# Patient Record
Sex: MALE | Race: BLACK OR AFRICAN AMERICAN | Employment: FULL TIME | URBAN - NONMETROPOLITAN AREA
[De-identification: names, ages, dates, MRNs, and addresses within clinical notes are randomized per-mention and may not be internally consistent; named-entity substitution may affect disease eponyms.]

---

## 2019-08-11 ENCOUNTER — APPOINTMENT (OUTPATIENT)
Dept: GENERAL RADIOLOGY | Age: 49
End: 2019-08-11
Payer: COMMERCIAL

## 2019-08-11 ENCOUNTER — HOSPITAL ENCOUNTER (EMERGENCY)
Age: 49
Discharge: HOME | End: 2019-08-11
Attending: EMERGENCY MEDICINE
Payer: COMMERCIAL

## 2019-08-11 VITALS
OXYGEN SATURATION: 96 % | DIASTOLIC BLOOD PRESSURE: 69 MMHG | SYSTOLIC BLOOD PRESSURE: 133 MMHG | WEIGHT: 189 LBS | TEMPERATURE: 98.8 F | HEART RATE: 66 BPM | RESPIRATION RATE: 20 BRPM

## 2019-08-11 DIAGNOSIS — T59.91XA TOXIC INHALATION INJURY, ACCIDENTAL OR UNINTENTIONAL, INITIAL ENCOUNTER: Primary | ICD-10-CM

## 2019-08-11 LAB
EKG ATRIAL RATE: 84 BPM
EKG P AXIS: 44 DEGREES
EKG P-R INTERVAL: 162 MS
EKG Q-T INTERVAL: 368 MS
EKG QRS DURATION: 82 MS
EKG QTC CALCULATION (BAZETT): 434 MS
EKG R AXIS: 7 DEGREES
EKG T AXIS: 11 DEGREES
EKG VENTRICULAR RATE: 84 BPM

## 2019-08-11 PROCEDURE — 94640 AIRWAY INHALATION TREATMENT: CPT

## 2019-08-11 PROCEDURE — 99284 EMERGENCY DEPT VISIT MOD MDM: CPT

## 2019-08-11 PROCEDURE — 71045 X-RAY EXAM CHEST 1 VIEW: CPT

## 2019-08-11 PROCEDURE — 93005 ELECTROCARDIOGRAM TRACING: CPT | Performed by: EMERGENCY MEDICINE

## 2019-08-11 PROCEDURE — 6370000000 HC RX 637 (ALT 250 FOR IP): Performed by: NURSE PRACTITIONER

## 2019-08-11 PROCEDURE — 93010 ELECTROCARDIOGRAM REPORT: CPT | Performed by: INTERNAL MEDICINE

## 2019-08-11 PROCEDURE — 2709999900 HC NON-CHARGEABLE SUPPLY

## 2019-08-11 RX ORDER — IPRATROPIUM BROMIDE AND ALBUTEROL SULFATE 2.5; .5 MG/3ML; MG/3ML
1 SOLUTION RESPIRATORY (INHALATION) ONCE
Status: COMPLETED | OUTPATIENT
Start: 2019-08-11 | End: 2019-08-11

## 2019-08-11 RX ORDER — ALBUTEROL SULFATE 90 UG/1
2 AEROSOL, METERED RESPIRATORY (INHALATION) 4 TIMES DAILY PRN
Qty: 1 INHALER | Refills: 0 | Status: SHIPPED | OUTPATIENT
Start: 2019-08-11

## 2019-08-11 RX ADMIN — IPRATROPIUM BROMIDE AND ALBUTEROL SULFATE 1 AMPULE: .5; 3 SOLUTION RESPIRATORY (INHALATION) at 03:35

## 2019-08-11 ASSESSMENT — ENCOUNTER SYMPTOMS
EYE DISCHARGE: 0
RHINORRHEA: 0
ABDOMINAL DISTENTION: 0
VOMITING: 0
COUGH: 1
SHORTNESS OF BREATH: 1
ABDOMINAL PAIN: 0
WHEEZING: 0
NAUSEA: 0
DIARRHEA: 0
EYE ITCHING: 0

## 2019-08-11 NOTE — ED NOTES
Pt is alert and oriented x4. Pt denies any chest pain, SOB and and any further problems with his cough. Pt updated on POC. All workers compensation paperwork has been completed. VSS. Call light within reach.                   Deshawn Ragsdale RN  08/11/19 2168

## 2019-08-11 NOTE — ED PROVIDER NOTES
[facility]  eMERGENCY dEPARTMENT eNCOUnter          CHIEF COMPLAINT       Chief Complaint   Patient presents with    Toxic Inhalation     Inhaled [id number]       Nurses Notes reviewed and I agreeexcept as noted in the HPI. HISTORY OF PRESENT ILLNESS    Dawson Fox is a 57 y.o. male who presents to Emergency Department with toxic inhalation which happened about 3 hours ago at work. She is was varying protection mask, his protection mask was hit incidentally and he was exposed to LK-823. This happened about one hour ago. According to the safety data sheet the ingredients of LK_823 includes Copper Oxide, Zinc Oxide, Aluminum Oxide and Cesium Carbonate. Inhalation of excessive amount of the dust may cause irritation of the respiratory system, symptoms may include coughing and difficulty in breathing. May cause physical irritation of eyes. May irriate skin. First aide measure os inhalation include move to fresh air and seek medical attention if uncomfortable in case of significant inhalation of of dust.     Patient says he has some difficulty breathing and mild chest pain with inhalation. He also has mild new onset of a cough as well. No other symptoms. He is healthy otherwise. REVIEW OF SYSTEMS     Review of Systems   Constitutional: Negative for activity change, appetite change, chills, fatigue and fever. HENT: Negative for congestion, ear discharge, hearing loss, nosebleeds and rhinorrhea. Eyes: Negative for discharge and itching. Respiratory: Positive for cough and shortness of breath. Negative for wheezing. Cardiovascular: Positive for chest pain. Gastrointestinal: Negative for abdominal distention, abdominal pain, diarrhea, nausea and vomiting. Endocrine: Negative for cold intolerance. Musculoskeletal: Negative for arthralgias, myalgias, neck pain and neck stiffness. Skin: Negative for rash and wound. Neurological: Negative for dizziness and weakness.

## 2023-11-20 ENCOUNTER — OFFICE VISIT (OUTPATIENT)
Dept: DERMATOLOGY | Facility: CLINIC | Age: 53
End: 2023-11-20
Payer: COMMERCIAL

## 2023-11-20 DIAGNOSIS — L21.9 SEBORRHEIC DERMATITIS: ICD-10-CM

## 2023-11-20 DIAGNOSIS — L73.0 ACNE KELOIDALIS NUCHAE: Primary | ICD-10-CM

## 2023-11-20 DIAGNOSIS — L73.1 PSEUDOFOLLICULITIS BARBAE: ICD-10-CM

## 2023-11-20 DIAGNOSIS — L72.9 CYST OF SKIN: ICD-10-CM

## 2023-11-20 PROCEDURE — 1159F PR MEDICATION LIST DOCUMENTED IN MEDICAL RECORD: ICD-10-PCS | Mod: CPTII,S$GLB,, | Performed by: DERMATOLOGY

## 2023-11-20 PROCEDURE — 1159F MED LIST DOCD IN RCRD: CPT | Mod: CPTII,S$GLB,, | Performed by: DERMATOLOGY

## 2023-11-20 PROCEDURE — 99999 PR PBB SHADOW E&M-NEW PATIENT-LVL III: CPT | Mod: PBBFAC,,, | Performed by: DERMATOLOGY

## 2023-11-20 PROCEDURE — 1160F RVW MEDS BY RX/DR IN RCRD: CPT | Mod: CPTII,S$GLB,, | Performed by: DERMATOLOGY

## 2023-11-20 PROCEDURE — 99204 OFFICE O/P NEW MOD 45 MIN: CPT | Mod: S$GLB,,, | Performed by: DERMATOLOGY

## 2023-11-20 PROCEDURE — 99204 PR OFFICE/OUTPT VISIT, NEW, LEVL IV, 45-59 MIN: ICD-10-PCS | Mod: S$GLB,,, | Performed by: DERMATOLOGY

## 2023-11-20 PROCEDURE — 99999 PR PBB SHADOW E&M-NEW PATIENT-LVL III: ICD-10-PCS | Mod: PBBFAC,,, | Performed by: DERMATOLOGY

## 2023-11-20 PROCEDURE — 1160F PR REVIEW ALL MEDS BY PRESCRIBER/CLIN PHARMACIST DOCUMENTED: ICD-10-PCS | Mod: CPTII,S$GLB,, | Performed by: DERMATOLOGY

## 2023-11-20 RX ORDER — KETOCONAZOLE 20 MG/G
CREAM TOPICAL
Qty: 60 G | Refills: 3 | Status: SHIPPED | OUTPATIENT
Start: 2023-11-20

## 2023-11-20 RX ORDER — CLOBETASOL PROPIONATE 0.46 MG/ML
SOLUTION TOPICAL
Qty: 50 ML | Refills: 3 | Status: SHIPPED | OUTPATIENT
Start: 2023-11-20

## 2023-11-20 RX ORDER — EVOLOCUMAB 140 MG/ML
INJECTION, SOLUTION SUBCUTANEOUS
COMMUNITY

## 2023-11-20 RX ORDER — CLINDAMYCIN PHOSPHATE 11.9 MG/ML
SOLUTION TOPICAL 2 TIMES DAILY
Qty: 60 ML | Refills: 5 | Status: SHIPPED | OUTPATIENT
Start: 2023-11-20

## 2023-11-20 NOTE — PROGRESS NOTES
Subjective:      Patient ID:  Pawel Mathis is a 53 y.o. male who presents for   Chief Complaint   Patient presents with    Dry Skin     Dry skin around nose/face. Reports using bio oil and snail mucin.       History of Present Illness: The patient presents with chief complaint of bump.  Location: scalp  Duration: several years  Signs/Symptoms: raised bump, not painful, itches around it, small bumps surrounding it, ingrown hairs    Prior treatments: ILK, pulling hairs, warm compresses        Review of Systems   Constitutional:  Negative for fever and chills.   Gastrointestinal:  Negative for nausea and vomiting.   Skin:  Positive for activity-related sunscreen use. Negative for daily sunscreen use and recent sunburn.   Hematologic/Lymphatic: Does not bruise/bleed easily.       Objective:   Physical Exam   Constitutional: He appears well-developed and well-nourished. No distress.   Neurological: He is alert and oriented to person, place, and time. He is not disoriented.   Psychiatric: He has a normal mood and affect.   Skin:   Areas Examined (abnormalities noted in diagram):   Scalp / Hair Palpated and Inspected  Head / Face Inspection Performed  Neck Inspection Performed  RUE Inspected  LUE Inspection Performed  Nails and Digits Inspection Performed              Assessment / Plan:        Acne keloidalis nuchae  Pseudofolliculitis barbae  -     clindamycin (CLEOCIN T) 1 % external solution; Apply topically 2 (two) times daily.  Dispense: 60 mL; Refill: 5  -     clobetasoL (TEMOVATE) 0.05 % external solution; AAA bid for scalp. Anti-inflammatory.  Dispense: 50 mL; Refill: 3  -     discussed dx, will start above med, recommend use bid for scalp and prn beard area.    Cyst of skin  -     Ambulatory referral/consult to General Surgery; Future; Expected date: 11/27/2023  -     of the right occipital scalp.  Given size, risk of bleeding given location, will refer to gen surg for further eval.  Discussed pt may be at  risk for keloid given hx of AKN on occipital scalp. The patient acknowledged understanding.     Seborrheic dermatitis  -     ketoconazole (NIZORAL) 2 % cream; AAA bid prn dry patches on face. Non-steroid. Safe to use long term for maintenance.  Dispense: 60 g; Refill: 3  -     per patient of the nasal crease area, will start above med prn.                Follow up in about 3 months (around 2/20/2024).

## 2023-12-13 ENCOUNTER — OFFICE VISIT (OUTPATIENT)
Dept: SURGERY | Facility: CLINIC | Age: 53
End: 2023-12-13
Payer: COMMERCIAL

## 2023-12-13 VITALS
HEART RATE: 73 BPM | DIASTOLIC BLOOD PRESSURE: 66 MMHG | WEIGHT: 182.13 LBS | HEIGHT: 70 IN | SYSTOLIC BLOOD PRESSURE: 116 MMHG | BODY MASS INDEX: 26.07 KG/M2

## 2023-12-13 DIAGNOSIS — L72.9 CYST OF SKIN: ICD-10-CM

## 2023-12-13 PROCEDURE — 3078F PR MOST RECENT DIASTOLIC BLOOD PRESSURE < 80 MM HG: ICD-10-PCS | Mod: CPTII,S$GLB,, | Performed by: SURGERY

## 2023-12-13 PROCEDURE — 99999 PR PBB SHADOW E&M-EST. PATIENT-LVL IV: CPT | Mod: PBBFAC,,, | Performed by: SURGERY

## 2023-12-13 PROCEDURE — 3074F SYST BP LT 130 MM HG: CPT | Mod: CPTII,S$GLB,, | Performed by: SURGERY

## 2023-12-13 PROCEDURE — 99203 PR OFFICE/OUTPT VISIT, NEW, LEVL III, 30-44 MIN: ICD-10-PCS | Mod: S$GLB,,, | Performed by: SURGERY

## 2023-12-13 PROCEDURE — 1159F MED LIST DOCD IN RCRD: CPT | Mod: CPTII,S$GLB,, | Performed by: SURGERY

## 2023-12-13 PROCEDURE — 99203 OFFICE O/P NEW LOW 30 MIN: CPT | Mod: S$GLB,,, | Performed by: SURGERY

## 2023-12-13 PROCEDURE — 3044F HG A1C LEVEL LT 7.0%: CPT | Mod: CPTII,S$GLB,, | Performed by: SURGERY

## 2023-12-13 PROCEDURE — 3074F PR MOST RECENT SYSTOLIC BLOOD PRESSURE < 130 MM HG: ICD-10-PCS | Mod: CPTII,S$GLB,, | Performed by: SURGERY

## 2023-12-13 PROCEDURE — 1160F PR REVIEW ALL MEDS BY PRESCRIBER/CLIN PHARMACIST DOCUMENTED: ICD-10-PCS | Mod: CPTII,S$GLB,, | Performed by: SURGERY

## 2023-12-13 PROCEDURE — 3008F PR BODY MASS INDEX (BMI) DOCUMENTED: ICD-10-PCS | Mod: CPTII,S$GLB,, | Performed by: SURGERY

## 2023-12-13 PROCEDURE — 99999 PR PBB SHADOW E&M-EST. PATIENT-LVL IV: ICD-10-PCS | Mod: PBBFAC,,, | Performed by: SURGERY

## 2023-12-13 PROCEDURE — 3078F DIAST BP <80 MM HG: CPT | Mod: CPTII,S$GLB,, | Performed by: SURGERY

## 2023-12-13 PROCEDURE — 3044F PR MOST RECENT HEMOGLOBIN A1C LEVEL <7.0%: ICD-10-PCS | Mod: CPTII,S$GLB,, | Performed by: SURGERY

## 2023-12-13 PROCEDURE — 3008F BODY MASS INDEX DOCD: CPT | Mod: CPTII,S$GLB,, | Performed by: SURGERY

## 2023-12-13 PROCEDURE — 1159F PR MEDICATION LIST DOCUMENTED IN MEDICAL RECORD: ICD-10-PCS | Mod: CPTII,S$GLB,, | Performed by: SURGERY

## 2023-12-13 PROCEDURE — 1160F RVW MEDS BY RX/DR IN RCRD: CPT | Mod: CPTII,S$GLB,, | Performed by: SURGERY

## 2023-12-13 NOTE — PROGRESS NOTES
"History & Physical    SUBJECTIVE:     History of Present Illness:  Patient is a 53 y.o. male referred for scalp cyst.  Patient reports having posterior scalp cyst for years.  Not currently draining, getting bigger or causing pain.    No chief complaint on file.      Review of patient's allergies indicates:   Allergen Reactions    Sulfa (sulfonamide antibiotics) Anaphylaxis and Swelling     Lip swelling    Sulfamethoxazole-trimethoprim        Current Outpatient Medications   Medication Sig Dispense Refill    clindamycin (CLEOCIN T) 1 % external solution Apply topically 2 (two) times daily. 60 mL 5    clobetasoL (TEMOVATE) 0.05 % external solution AAA bid for scalp. Anti-inflammatory. 50 mL 3    ketoconazole (NIZORAL) 2 % cream AAA bid prn dry patches on face. Non-steroid. Safe to use long term for maintenance. 60 g 3    REPATHA SURECLICK 140 mg/mL PnIj Inject into the skin.       No current facility-administered medications for this visit.       History reviewed. No pertinent past medical history.  History reviewed. No pertinent surgical history.  History reviewed. No pertinent family history.  Social History     Tobacco Use    Smoking status: Never    Smokeless tobacco: Never        Review of Systems:  Review of Systems   Constitutional:  Negative for chills, fever and unexpected weight change.   HENT:  Negative for congestion.    Eyes:  Negative for visual disturbance.   Respiratory:  Negative for shortness of breath.    Cardiovascular:  Negative for chest pain.   Gastrointestinal:  Negative for abdominal distention, abdominal pain, constipation, nausea, rectal pain and vomiting.   Genitourinary:  Negative for dysuria.   Musculoskeletal:  Negative for arthralgias.   Skin:  Negative for rash.   Neurological:  Negative for light-headedness.   Hematological:  Negative for adenopathy.       OBJECTIVE:     Vital Signs (Most Recent)  Pulse: 73 (12/13/23 1426)  BP: 116/66 (12/13/23 1426)  5' 10" (1.778 m)  82.6 kg (182 lb " 1.6 oz)     Physical Exam:  Physical Exam  Vitals reviewed.   Constitutional:       Appearance: He is well-developed.   HENT:      Head: Normocephalic and atraumatic.     Cardiovascular:      Rate and Rhythm: Normal rate and regular rhythm.   Pulmonary:      Effort: Pulmonary effort is normal.      Breath sounds: Normal breath sounds.   Abdominal:      General: Bowel sounds are normal. There is no distension.      Palpations: Abdomen is soft.      Tenderness: There is no abdominal tenderness.   Musculoskeletal:      Cervical back: Normal range of motion and neck supple.   Skin:     General: Skin is warm and dry.   Neurological:      Mental Status: He is alert and oriented to person, place, and time.             ASSESSMENT/PLAN:     53-year-old male with posterior scalp cyst    PLAN:Plan     Schedule for excision in minor procedure room

## 2023-12-21 ENCOUNTER — PROCEDURE VISIT (OUTPATIENT)
Dept: SURGERY | Facility: CLINIC | Age: 53
End: 2023-12-21
Payer: COMMERCIAL

## 2023-12-21 VITALS
SYSTOLIC BLOOD PRESSURE: 111 MMHG | WEIGHT: 185.19 LBS | BODY MASS INDEX: 26.57 KG/M2 | DIASTOLIC BLOOD PRESSURE: 74 MMHG | HEART RATE: 74 BPM

## 2023-12-21 DIAGNOSIS — L72.9 SCALP CYST: Primary | ICD-10-CM

## 2023-12-21 PROCEDURE — 11422 PR EXC SKIN BENIG 1.1-2 CM REMAINDR BODY: ICD-10-PCS | Mod: S$GLB,,, | Performed by: SURGERY

## 2023-12-21 PROCEDURE — 11422 EXC H-F-NK-SP B9+MARG 1.1-2: CPT | Mod: S$GLB,,, | Performed by: SURGERY

## 2023-12-22 NOTE — PROCEDURES
Exc, Cyst    Date/Time: 12/21/2023 3:20 PM    Performed by: Deejay Sy MD  Authorized by: Deejay Sy MD    Consent Done?:  Yes (Written)  Timeout: prior to procedure the correct patient, procedure, and site was verified    Prep: patient was prepped and draped in usual sterile fashion    Local anesthesia used?: Yes    Anesthesia:  Local infiltration  Local anesthetic:  Lidocaine 1% with epinephrine  Anesthetic total (ml):  10  Assistants?: No    Indications:  Cyst  Anesthesia:  Local infiltration  Local anesthetic:  Lidocaine 1% with epinephrine  Excision type:  Skin  Malignancy:  Benign  Excision size (cm):  2  Scalpel size:  15  Incision type:  Single straight and elliptical  Specimens?: Yes     Hemostasis was obtained.  Estimated blood loss (cc):  1  Wound closure:  Intermediate layered  Wound repair size (cm):  2  Sutures:  3-0 Vicryl (3-0 nylon)  Dressings: bacitracin ointment and bandaid.  Post-op diagnosis:  Same as pre-op diagnosis.   Needle, instrument, and sponge counts were correct.   Patient tolerated the procedure well with no immediate complications.   Post-operative instructions were provided for the patient.   Patient was discharged and will follow up for wound check.

## 2024-01-04 ENCOUNTER — OFFICE VISIT (OUTPATIENT)
Dept: SURGERY | Facility: CLINIC | Age: 54
End: 2024-01-04
Payer: COMMERCIAL

## 2024-01-04 ENCOUNTER — TELEPHONE (OUTPATIENT)
Dept: SURGERY | Facility: CLINIC | Age: 54
End: 2024-01-04
Payer: COMMERCIAL

## 2024-01-04 VITALS
WEIGHT: 181.88 LBS | DIASTOLIC BLOOD PRESSURE: 73 MMHG | HEIGHT: 70 IN | HEART RATE: 79 BPM | SYSTOLIC BLOOD PRESSURE: 120 MMHG | BODY MASS INDEX: 26.04 KG/M2 | TEMPERATURE: 98 F

## 2024-01-04 DIAGNOSIS — L72.9 SCALP CYST: Primary | ICD-10-CM

## 2024-01-04 PROCEDURE — 99024 POSTOP FOLLOW-UP VISIT: CPT | Mod: S$GLB,,, | Performed by: SURGERY

## 2024-01-04 PROCEDURE — 99999 PR PBB SHADOW E&M-EST. PATIENT-LVL III: CPT | Mod: PBBFAC,,, | Performed by: SURGERY

## 2024-01-04 PROCEDURE — 1160F RVW MEDS BY RX/DR IN RCRD: CPT | Mod: CPTII,S$GLB,, | Performed by: SURGERY

## 2024-01-04 PROCEDURE — 3078F DIAST BP <80 MM HG: CPT | Mod: CPTII,S$GLB,, | Performed by: SURGERY

## 2024-01-04 PROCEDURE — 3074F SYST BP LT 130 MM HG: CPT | Mod: CPTII,S$GLB,, | Performed by: SURGERY

## 2024-01-04 PROCEDURE — 1159F MED LIST DOCD IN RCRD: CPT | Mod: CPTII,S$GLB,, | Performed by: SURGERY

## 2024-01-04 NOTE — TELEPHONE ENCOUNTER
----- Message from Ishmael Reid sent at 1/4/2024 10:19 AM CST -----  Contact: Patient  Pawel Mathis would like a call back at [phone], in regards to scheduling an appt for stitch removal.      No/Not applicable

## 2024-01-04 NOTE — PROGRESS NOTES
History & Physical    SUBJECTIVE:     History of Present Illness:  Patient is a 53 y.o. male status post scalp cyst excision presents for postop.  He is doing well with no complaints.    Initially referred for scalp cyst.  Patient reports having posterior scalp cyst for years.  Not currently draining, getting bigger or causing pain.    No chief complaint on file.      Review of patient's allergies indicates:   Allergen Reactions    Sulfa (sulfonamide antibiotics) Anaphylaxis and Swelling     Lip swelling    Sulfamethoxazole-trimethoprim        Current Outpatient Medications   Medication Sig Dispense Refill    clindamycin (CLEOCIN T) 1 % external solution Apply topically 2 (two) times daily. 60 mL 5    clobetasoL (TEMOVATE) 0.05 % external solution AAA bid for scalp. Anti-inflammatory. 50 mL 3    ketoconazole (NIZORAL) 2 % cream AAA bid prn dry patches on face. Non-steroid. Safe to use long term for maintenance. 60 g 3    REPATHA SURECLICK 140 mg/mL PnIj Inject into the skin.       No current facility-administered medications for this visit.       No past medical history on file.  No past surgical history on file.  No family history on file.  Social History     Tobacco Use    Smoking status: Never    Smokeless tobacco: Never        Review of Systems:  Review of Systems   Constitutional:  Negative for chills, fever and unexpected weight change.   HENT:  Negative for congestion.    Eyes:  Negative for visual disturbance.   Respiratory:  Negative for shortness of breath.    Cardiovascular:  Negative for chest pain.   Gastrointestinal:  Negative for abdominal distention, abdominal pain, constipation, nausea, rectal pain and vomiting.   Genitourinary:  Negative for dysuria.   Musculoskeletal:  Negative for arthralgias.   Skin:  Negative for rash.   Neurological:  Negative for light-headedness.   Hematological:  Negative for adenopathy.       OBJECTIVE:     Vital Signs (Most Recent)  Temp: 98 °F (36.7 °C) (01/04/24  "1347)  Pulse: 79 (01/04/24 1347)  BP: 120/73 (01/04/24 1347)  5' 10" (1.778 m)  82.5 kg (181 lb 14.1 oz)     Physical Exam:  Physical Exam  Vitals reviewed.   Constitutional:       Appearance: He is well-developed.   HENT:      Head: Normocephalic and atraumatic.     Cardiovascular:      Rate and Rhythm: Normal rate and regular rhythm.   Pulmonary:      Effort: Pulmonary effort is normal.      Breath sounds: Normal breath sounds.   Abdominal:      General: Bowel sounds are normal. There is no distension.      Palpations: Abdomen is soft.      Tenderness: There is no abdominal tenderness.   Musculoskeletal:      Cervical back: Normal range of motion and neck supple.   Skin:     General: Skin is warm and dry.   Neurological:      Mental Status: He is alert and oriented to person, place, and time.             ASSESSMENT/PLAN:     53-year-old male status post excision posterior scalp cyst    PLAN:Plan     Healing well   Sutures removed  Follow-up p.r.n.         "

## 2024-03-11 ENCOUNTER — OFFICE VISIT (OUTPATIENT)
Dept: DERMATOLOGY | Facility: CLINIC | Age: 54
End: 2024-03-11
Payer: COMMERCIAL

## 2024-03-11 DIAGNOSIS — L73.0 ACNE KELOIDALIS NUCHAE: Primary | ICD-10-CM

## 2024-03-11 PROCEDURE — 1159F MED LIST DOCD IN RCRD: CPT | Mod: CPTII,S$GLB,, | Performed by: DERMATOLOGY

## 2024-03-11 PROCEDURE — 99999 PR PBB SHADOW E&M-EST. PATIENT-LVL III: CPT | Mod: PBBFAC,,, | Performed by: DERMATOLOGY

## 2024-03-11 PROCEDURE — 99213 OFFICE O/P EST LOW 20 MIN: CPT | Mod: 25,S$GLB,, | Performed by: DERMATOLOGY

## 2024-03-11 PROCEDURE — 1160F RVW MEDS BY RX/DR IN RCRD: CPT | Mod: CPTII,S$GLB,, | Performed by: DERMATOLOGY

## 2024-03-11 PROCEDURE — 11900 INJECT SKIN LESIONS </W 7: CPT | Mod: S$GLB,,, | Performed by: DERMATOLOGY

## 2024-03-11 RX ORDER — MOMETASONE FUROATE 1 MG/G
CREAM TOPICAL
Qty: 45 G | Refills: 3 | Status: SHIPPED | OUTPATIENT
Start: 2024-03-11

## 2024-03-11 NOTE — PROGRESS NOTES
Subjective:      Patient ID:  Pawel Mathis is a 53 y.o. male who presents for   Chief Complaint   Patient presents with    Follow-up     F/u on the bumps on the back of the scalp.  Reports letting hair grow out, but reports bumps got worse.  Reports tweezing the hairs out. Itching, sores formed, bleeding. Pt reports having cyst removed in the back of scalp since last seen.       Hx of AKN, cyst of the sclap (s/p excison by gen surg), and seb derm, last seen on 11/20/23. He uses clinda solution and clobetasol solution bid without improvement.  + discomfort and swelling.     Prior tx: ILK, clinda solution, clobetasol solution    For seb derm, he uses ketoconazole cream.         Review of Systems   Constitutional:  Negative for fever and chills.   Gastrointestinal:  Negative for nausea and vomiting.   Skin:  Positive for activity-related sunscreen use. Negative for daily sunscreen use and recent sunburn.   Hematologic/Lymphatic: Does not bruise/bleed easily.       Objective:   Physical Exam   Constitutional: He appears well-developed and well-nourished. No distress.   Neurological: He is alert and oriented to person, place, and time. He is not disoriented.   Psychiatric: He has a normal mood and affect.   Skin:   Areas Examined (abnormalities noted in diagram):   Scalp / Hair Palpated and Inspected  Head / Face Inspection Performed  Neck Inspection Performed  RUE Inspected  LUE Inspection Performed  Nails and Digits Inspection Performed              Assessment / Plan:        Acne keloidalis nuchae  -     triamcinolone acetonide injection 10 mg  -     mometasone 0.1% (ELOCON) 0.1 % cream; AAA bid prn. Do not use on face, underarms or groin. Stronger steroid.  Dispense: 45 g; Refill: 3  -    continue clindamycin solution, we will discontinue clobetasol solution.  We will start above medication. ILK #1 done today.  After risks, benefits and alternatives explained and side effect profile reviewed, including  hypopigmentation, telangiectasia and atrophy, the patient verbally consented to ILK injection. A total of 1 cc of kenalog 10 mg/ml was injected into the areas of AKN of the occipital scalp for < 7 sites/lesions.  Pt tolerated well without side effects.  RTC in 8 weeks for repeat injection.           Follow up in about 2 months (around 5/11/2024).

## 2024-07-01 ENCOUNTER — OFFICE VISIT (OUTPATIENT)
Dept: DERMATOLOGY | Facility: CLINIC | Age: 54
End: 2024-07-01
Payer: COMMERCIAL

## 2024-07-01 DIAGNOSIS — L73.0 ACNE KELOIDALIS NUCHAE: Primary | ICD-10-CM

## 2024-07-01 PROCEDURE — 99999 PR PBB SHADOW E&M-EST. PATIENT-LVL III: CPT | Mod: PBBFAC,,, | Performed by: DERMATOLOGY

## 2024-07-01 PROCEDURE — 11900 INJECT SKIN LESIONS </W 7: CPT | Mod: S$GLB,,, | Performed by: DERMATOLOGY

## 2024-07-01 PROCEDURE — 99499 UNLISTED E&M SERVICE: CPT | Mod: S$GLB,,, | Performed by: DERMATOLOGY

## 2024-07-01 NOTE — PROGRESS NOTES
Subjective:      Patient ID:  Pawel Mathis is a 53 y.o. male who presents for   Chief Complaint   Patient presents with    Hair/Scalp Problem     F/U.     Hx of AKN, cyst of the sclap (s/p excison by gen surg), and seb derm, last seen on 3/11/24. S/p ILK #1. He uses clinda solution BID and mometasone cream prn.  + improvement.    Prior tx: ILK, clinda solution, clobetasol solution, mometasone cream    For seb derm, he uses ketoconazole cream.         Review of Systems   Constitutional:  Negative for fever and chills.   Gastrointestinal:  Negative for nausea and vomiting.   Skin:  Positive for activity-related sunscreen use. Negative for daily sunscreen use and recent sunburn.   Hematologic/Lymphatic: Does not bruise/bleed easily.       Objective:   Physical Exam   Constitutional: He appears well-developed and well-nourished. No distress.   Neurological: He is alert and oriented to person, place, and time. He is not disoriented.   Psychiatric: He has a normal mood and affect.   Skin:   Areas Examined (abnormalities noted in diagram):   Scalp / Hair Palpated and Inspected  Head / Face Inspection Performed  Neck Inspection Performed  RUE Inspected  LUE Inspection Performed  Nails and Digits Inspection Performed           Assessment / Plan:        Acne keloidalis nuchae  -     triamcinolone acetonide injection 10 mg  -    continue clindamycin solution and mometasone cream prn.  ILK #2 done today.  After risks, benefits and alternatives explained and side effect profile reviewed, including hypopigmentation, telangiectasia and atrophy, the patient verbally consented to ILK injection. A total of 1 cc of kenalog 10 mg/ml was injected into the areas of AKN of the occipital scalp for < 7 sites/lesions.  Pt tolerated well without side effects.             Follow up in about 3 months (around 10/1/2024).

## 2024-12-16 ENCOUNTER — OFFICE VISIT (OUTPATIENT)
Dept: DERMATOLOGY | Facility: CLINIC | Age: 54
End: 2024-12-16
Payer: COMMERCIAL

## 2024-12-16 DIAGNOSIS — L73.0 ACNE KELOIDALIS NUCHAE: ICD-10-CM

## 2024-12-16 DIAGNOSIS — L21.9 SEBORRHEIC DERMATITIS: Primary | ICD-10-CM

## 2024-12-16 PROCEDURE — 1159F MED LIST DOCD IN RCRD: CPT | Mod: CPTII,S$GLB,, | Performed by: DERMATOLOGY

## 2024-12-16 PROCEDURE — 11900 INJECT SKIN LESIONS </W 7: CPT | Mod: S$GLB,,, | Performed by: DERMATOLOGY

## 2024-12-16 PROCEDURE — 99214 OFFICE O/P EST MOD 30 MIN: CPT | Mod: 25,S$GLB,, | Performed by: DERMATOLOGY

## 2024-12-16 PROCEDURE — 1160F RVW MEDS BY RX/DR IN RCRD: CPT | Mod: CPTII,S$GLB,, | Performed by: DERMATOLOGY

## 2024-12-16 PROCEDURE — 99999 PR PBB SHADOW E&M-EST. PATIENT-LVL III: CPT | Mod: PBBFAC,,, | Performed by: DERMATOLOGY

## 2024-12-16 RX ORDER — FLUTICASONE PROPIONATE 50 MCG
1 SPRAY, SUSPENSION (ML) NASAL DAILY
COMMUNITY

## 2024-12-16 RX ORDER — CLINDAMYCIN AND BENZOYL PEROXIDE 10; 50 MG/G; MG/G
GEL TOPICAL
Qty: 50 G | Refills: 11 | Status: SHIPPED | OUTPATIENT
Start: 2024-12-16

## 2024-12-16 RX ORDER — KETOCONAZOLE 20 MG/ML
SHAMPOO, SUSPENSION TOPICAL
Qty: 120 ML | Refills: 11 | Status: SHIPPED | OUTPATIENT
Start: 2024-12-16

## 2024-12-16 RX ORDER — FLUOCINOLONE ACETONIDE 0.11 MG/ML
OIL TOPICAL
Qty: 118 ML | Refills: 5 | Status: SHIPPED | OUTPATIENT
Start: 2024-12-16

## 2024-12-16 RX ORDER — CLINDAMYCIN PHOSPHATE 11.9 MG/ML
SOLUTION TOPICAL 2 TIMES DAILY
Qty: 60 ML | Refills: 11 | Status: SHIPPED | OUTPATIENT
Start: 2024-12-16

## 2024-12-16 RX ORDER — LEVOCETIRIZINE DIHYDROCHLORIDE 5 MG/1
5 TABLET, FILM COATED ORAL NIGHTLY
COMMUNITY

## 2024-12-16 RX ORDER — KETOCONAZOLE 20 MG/G
CREAM TOPICAL
Qty: 60 G | Refills: 5 | Status: SHIPPED | OUTPATIENT
Start: 2024-12-16

## 2024-12-16 RX ORDER — TRIAMCINOLONE ACETONIDE 0.25 MG/G
CREAM TOPICAL 2 TIMES DAILY PRN
Qty: 80 G | Refills: 1 | Status: SHIPPED | OUTPATIENT
Start: 2024-12-16

## 2024-12-16 NOTE — PROGRESS NOTES
Subjective:      Patient ID:  Pawel Mathis is a 54 y.o. male who presents for   Chief Complaint   Patient presents with    Hair/Scalp Problem     F/u on the acne keloidalis nuchae. Bumps still present.        Dry Skin     Reports peelings skin around nose, face, and scalp.  Areas itch. Reports skin is worse in the winter time. Seeking recommendations.       Hx of AKN, cyst of the sclap (s/p excison by gen surg), and seb derm, last seen on 7/1/24. S/p ILK #2. He uses clinda solution BID and mometasone cream prn.  + improvement however new lesions.     Prior tx: ILK, clinda solution, clobetasol solution, mometasone cream    For seb derm, he uses ketoconazole cream. + current dryness of the face, perinasal areas.    Hair/Scalp Problem    Dry Skin        Review of Systems   Constitutional:  Negative for fever and chills.   Gastrointestinal:  Negative for nausea and vomiting.   Skin:  Positive for itching, rash, dry skin and activity-related sunscreen use. Negative for daily sunscreen use and recent sunburn.   Hematologic/Lymphatic: Does not bruise/bleed easily.       Objective:   Physical Exam   Constitutional: He appears well-developed and well-nourished. No distress.   Neurological: He is alert and oriented to person, place, and time. He is not disoriented.   Psychiatric: He has a normal mood and affect.   Skin:   Areas Examined (abnormalities noted in diagram):   Scalp / Hair Palpated and Inspected  Head / Face Inspection Performed  Neck Inspection Performed  Chest / Axilla Inspection Performed  Back Inspection Performed  RUE Inspected  LUE Inspection Performed  Nails and Digits Inspection Performed           Assessment / Plan:        Seborrheic dermatitis  -     fluocinolone (DERMA-SMOOTHE) 0.01 % external oil; Apply oil to scalp once a day.  Dispense: 118 mL; Refill: 5  -     ketoconazole (NIZORAL) 2 % cream; AAA bid for dryness or scaling of face.  Non-steroid. Safe to use long-term.  Dispense: 60 g; Refill:  5  -     triamcinolone acetonide 0.025% (KENALOG) 0.025 % cream; Apply topically 2 (two) times daily as needed. Mild steroid. Use sparingly for 1-2 weeks if needed then stop.  Dispense: 80 g; Refill: 1  -     ketoconazole (NIZORAL) 2 % shampoo; Use at least once/week as shampoo - lather, let sit on scalp at least 5-10 minutes prior to rinsing  Dispense: 120 mL; Refill: 11  -     we will start above minutes for dryness of scalp.    Acne keloidalis nuchae  -     clindamycin (CLEOCIN T) 1 % external solution; Apply topically 2 (two) times daily.  Dispense: 60 mL; Refill: 11  -     clindamycin-benzoyl peroxide (BENZACLIN) gel; AAA bid prn for in-grown hairs in back of scalp  Dispense: 50 g; Refill: 11  -     triamcinolone acetonide injection 10 mg  -    continue clindamycin solution and mometasone cream prn, we will add BenzaClin gel daily as tolerated.  ILK #3 done today.  After risks, benefits and alternatives explained and side effect profile reviewed, including hypopigmentation, telangiectasia and atrophy, the patient verbally consented to ILK injection. A total of 1 cc of kenalog 10 mg/ml was injected into the areas of AKN of the occipital scalp for < 7 sites/lesions.  Pt tolerated well without side effects.             No follow-ups on file.

## 2025-04-07 ENCOUNTER — OFFICE VISIT (OUTPATIENT)
Dept: DERMATOLOGY | Facility: CLINIC | Age: 55
End: 2025-04-07
Payer: COMMERCIAL

## 2025-04-07 DIAGNOSIS — L73.1 PSEUDOFOLLICULITIS BARBAE: ICD-10-CM

## 2025-04-07 DIAGNOSIS — L73.0 ACNE KELOIDALIS NUCHAE: Primary | ICD-10-CM

## 2025-04-07 DIAGNOSIS — L21.9 SEBORRHEIC DERMATITIS: ICD-10-CM

## 2025-04-07 PROCEDURE — 1159F MED LIST DOCD IN RCRD: CPT | Mod: CPTII,S$GLB,, | Performed by: DERMATOLOGY

## 2025-04-07 PROCEDURE — 11900 INJECT SKIN LESIONS </W 7: CPT | Mod: S$GLB,,, | Performed by: DERMATOLOGY

## 2025-04-07 PROCEDURE — 99214 OFFICE O/P EST MOD 30 MIN: CPT | Mod: 25,S$GLB,, | Performed by: DERMATOLOGY

## 2025-04-07 PROCEDURE — 99999 PR PBB SHADOW E&M-EST. PATIENT-LVL III: CPT | Mod: PBBFAC,,, | Performed by: DERMATOLOGY

## 2025-04-07 PROCEDURE — 1160F RVW MEDS BY RX/DR IN RCRD: CPT | Mod: CPTII,S$GLB,, | Performed by: DERMATOLOGY

## 2025-04-07 RX ORDER — BENZOYL PEROXIDE 100 MG/ML
LIQUID TOPICAL
Qty: 227 G | Refills: 12 | Status: SHIPPED | OUTPATIENT
Start: 2025-04-07

## 2025-04-07 RX ORDER — KETOCONAZOLE 20 MG/ML
SHAMPOO, SUSPENSION TOPICAL
Qty: 120 ML | Refills: 11 | Status: SHIPPED | OUTPATIENT
Start: 2025-04-07

## 2025-04-07 NOTE — PROGRESS NOTES
Subjective:      Patient ID:  Pawel Mathis is a 54 y.o. male who presents for   Chief Complaint   Patient presents with    Hair/Scalp Problem     Reports a little improvement.  Still getting flare ups from time to time. Pt has been using prescribed medication.       Hx of AKN, cyst of the sclap (s/p excison by gen surg), and seb derm, last seen on 12/24/24. S/p ILK #3. He uses clinda solution BID, clinda-BP gel bid, and mometasone cream prn.  + improvement however new lesions.     Prior tx: ILK, clinda solution, clobetasol solution, mometasone cream    For seb derm, he uses ketoconazole cream/TAC 0.025% cream prn and fluocinolone oil for scalp prn c/ OTC bio-oil. Did not receive keto shampoo.    Hair/Scalp Problem    Dry Skin        Review of Systems   Constitutional:  Negative for fever and chills.   Gastrointestinal:  Negative for nausea and vomiting.   Skin:  Positive for itching, rash, dry skin and activity-related sunscreen use. Negative for daily sunscreen use and recent sunburn.   Hematologic/Lymphatic: Does not bruise/bleed easily.       Objective:   Physical Exam   Constitutional: He appears well-developed and well-nourished. No distress.   Neurological: He is alert and oriented to person, place, and time. He is not disoriented.   Psychiatric: He has a normal mood and affect.   Skin:   Areas Examined (abnormalities noted in diagram):   Scalp / Hair Palpated and Inspected  Head / Face Inspection Performed  Neck Inspection Performed  RUE Inspected  LUE Inspection Performed  Nails and Digits Inspection Performed           Assessment / Plan:        Acne keloidalis nuchae  -     benzoyl peroxide (BP WASH) 10 % external wash; Use daily as wash for scalp and beard area for ingrown hairs. Rinse completely.  May bleach clothing  Dispense: 227 g; Refill: 12  -     triamcinolone acetonide injection 10 mg  -     continue clindamycin solution and BenzaClin gel as needed with the mometasone cream.  We will add  benzoyl peroxide cleanser.    ILK #4 done today.  After risks, benefits and alternatives explained and side effect profile reviewed, including hypopigmentation, telangiectasia and atrophy, the patient verbally consented to ILK injection. A total of 0.4 cc of kenalog 10 mg/ml was injected into the areas of AKN of the occipital scalp for < 7 sites/lesions.  Pt tolerated well without side effects.      Seborrheic dermatitis  -     ketoconazole (NIZORAL) 2 % shampoo; Wash hair with medicated shampoo at least 1x/week - let sit on scalp at least 5 minutes prior to rinsing  Dispense: 120 mL; Refill: 11  -     we will resend ketoconazole shampoo, continue fluocinolone oil.  Continue triamcinolone 0.025% cream and ketoconazole cream as needed.         Follow up if symptoms worsen or fail to improve.